# Patient Record
Sex: MALE | Race: BLACK OR AFRICAN AMERICAN | Employment: OTHER | URBAN - METROPOLITAN AREA
[De-identification: names, ages, dates, MRNs, and addresses within clinical notes are randomized per-mention and may not be internally consistent; named-entity substitution may affect disease eponyms.]

---

## 2019-06-15 ENCOUNTER — IP HISTORICAL/CONVERTED ENCOUNTER (OUTPATIENT)
Dept: OTHER | Age: 64
End: 2019-06-15

## 2020-11-10 ENCOUNTER — TRANSCRIBE ORDER (OUTPATIENT)
Dept: SCHEDULING | Age: 65
End: 2020-11-10

## 2020-11-10 DIAGNOSIS — I25.2 OLD MYOCARDIAL INFARCTION: Primary | ICD-10-CM

## 2020-11-19 ENCOUNTER — HOSPITAL ENCOUNTER (OUTPATIENT)
Dept: NON INVASIVE DIAGNOSTICS | Age: 65
Discharge: HOME | End: 2020-11-19
Attending: INTERNAL MEDICINE
Payer: MEDICARE

## 2020-11-19 VITALS
BODY MASS INDEX: 40.92 KG/M2 | DIASTOLIC BLOOD PRESSURE: 89 MMHG | WEIGHT: 270 LBS | SYSTOLIC BLOOD PRESSURE: 161 MMHG | HEIGHT: 68 IN

## 2020-11-19 DIAGNOSIS — I25.2 OLD MYOCARDIAL INFARCTION: ICD-10-CM

## 2020-11-19 LAB
ECHO AO ROOT DIAM: 4.1 CM
ECHO AV AREA PEAK VELOCITY: 3.85 CM2
ECHO AV AREA PLAN/BSA: 1.57
ECHO AV AREA VTI: 3.64 CM2
ECHO AV AREA/BSA PEAK VELOCITY: 1.7 CM2/M2
ECHO AV AREA/BSA VTI: 1.6 CM2/M2
ECHO AV CUSP MM: 2.2 CM
ECHO AV MEAN GRADIENT: 3 MMHG
ECHO AV MEAN VELOCITY: 77.2 CM/S
ECHO AV PEAK GRADIENT: 5 MMHG
ECHO AV VTI: 22.8 CM
ECHO EST RA PRESSURE: 3 MMHG
ECHO LA AREA 2C: 20.9 CM2
ECHO LA AREA 4C: 27.1 CM2
ECHO LA MAJOR AXIS: 7.17 CM
ECHO LA MINOR AXIS: 3.09 CM
ECHO LA TO AORTIC ROOT RATIO: 1.05
ECHO LV E' LATERAL VELOCITY: 6.64 CM/S
ECHO LV E' SEPTAL VELOCITY: 5.33 CM/S
ECHO LV EDV A2C: 118 CM3
ECHO LV EJECTION FRACTION A2C: 22 %
ECHO LV EJECTION FRACTION BIPLANE: 45.1 % (ref 55–100)
ECHO LV ESV A2C: 54.9 CM3
ECHO LV INTERNAL DIMENSION DIASTOLIC: 4.9 CM (ref 4.2–5.9)
ECHO LV INTERNAL DIMENSION SYSTOLIC: 3.8 CM
ECHO LV IVSD: 1 CM (ref 0.6–1)
ECHO LV MASS 2D: 213.3 G (ref 88–224)
ECHO LV MASS INDEX 2D: 91.8 G/M2 (ref 49–115)
ECHO LV POSTERIOR WALL DIASTOLIC: 1.3 CM (ref 0.6–1)
ECHO LVOT DIAM: 2.3 CM
ECHO LVOT PEAK GRADIENT: 4 MMHG
ECHO LVOT SV: 83 CM3
ECHO LVOT VTI: 20 CM
ECHO LVOT VTI: 20 CM
ECHO MV A VELOCITY: 57.7 CM/S
ECHO MV AREA PHT: 3.24 CM2
ECHO MV E DECELERATION TIME (DT): 232 MS
ECHO MV E VELOCITY: 59.2 CM/S
ECHO MV E/A RATIO: 1.03
ECHO MV E/E' LATERAL: 8.92
ECHO MV E/E' RATIO (AVERAGED): 10.01
ECHO MV E/E' SEPTAL: 11.11
ECHO MV PRESSURE HALF TIME (PHT): 68 MS
ECHO PV REGURGITANT MAX VELOCITY: 104 CM/S
ECHO PV REGURGITANT MAX VELOCITY: 112 CM/S
ECHO PV REGURGITANT MAX VELOCITY: 315 CM/S
ECHO RA AREA 4C: 21.8 CM2
ECHO RA MAJOR AXIS: 6.57 CM
ECHO RIGHT VENTRICULAR SYSTOLIC PRESSURE (RVSP): 43 MMHG
ECHO RV INTERNAL DIMENSION: 3.6 CM
ECHO RV TAPSE: 2.62 CM (ref 1.5–2)
ECHO TV MEAN GRADIENT: 40 MMHG
LA VOL DISK BP: 80.37 CM3 (ref 18–58)
LVOT MG: 2 MMHG
MV DEC SLOPE: 2550 MM/S2
MV DEC SLOPE: 2550 MM/S2

## 2020-11-19 PROCEDURE — 93306 TTE W/DOPPLER COMPLETE: CPT

## 2021-08-13 ENCOUNTER — TRANSCRIBE ORDER (OUTPATIENT)
Dept: SCHEDULING | Age: 66
End: 2021-08-13

## 2021-08-13 DIAGNOSIS — I25.5 ISCHEMIC CARDIOMYOPATHY: Primary | ICD-10-CM

## 2021-08-13 DIAGNOSIS — R00.2 PALPITATIONS: ICD-10-CM

## 2021-09-07 ENCOUNTER — TRANSCRIBE ORDER (OUTPATIENT)
Dept: REGISTRATION | Age: 66
End: 2021-09-07

## 2021-09-07 ENCOUNTER — HOSPITAL ENCOUNTER (OUTPATIENT)
Dept: NON INVASIVE DIAGNOSTICS | Age: 66
Discharge: HOME | End: 2021-09-07
Attending: INTERNAL MEDICINE
Payer: MEDICARE

## 2021-09-07 ENCOUNTER — HOSPITAL ENCOUNTER (OUTPATIENT)
Dept: GENERAL RADIOLOGY | Age: 66
Discharge: HOME | End: 2021-09-07
Attending: INTERNAL MEDICINE
Payer: MEDICARE

## 2021-09-07 VITALS
WEIGHT: 280 LBS | BODY MASS INDEX: 42.44 KG/M2 | DIASTOLIC BLOOD PRESSURE: 90 MMHG | HEIGHT: 68 IN | SYSTOLIC BLOOD PRESSURE: 180 MMHG

## 2021-09-07 DIAGNOSIS — I25.5 ISCHEMIC CARDIOMYOPATHY: ICD-10-CM

## 2021-09-07 DIAGNOSIS — R00.2 PALPITATIONS: ICD-10-CM

## 2021-09-07 DIAGNOSIS — J44.9 COPD (CHRONIC OBSTRUCTIVE PULMONARY DISEASE) (HCC): ICD-10-CM

## 2021-09-07 DIAGNOSIS — J44.9 COPD (CHRONIC OBSTRUCTIVE PULMONARY DISEASE) (HCC): Primary | ICD-10-CM

## 2021-09-07 LAB
ECHO AO ASC DIAM: 3.9 CM
ECHO AO ROOT DIAM: 4 CM
ECHO AV AREA PEAK VELOCITY: 4.3 CM2
ECHO AV AREA PLAN/BSA: 1.75
ECHO AV AREA VTI: 4.12 CM2
ECHO AV AREA/BSA PEAK VELOCITY: 1.8 CM2/M2
ECHO AV AREA/BSA VTI: 1.7 CM2/M2
ECHO AV CUSP MM: 2.1 CM
ECHO AV MEAN GRADIENT: 2 MMHG
ECHO AV MEAN VELOCITY: 60.6 CM/S
ECHO AV PEAK GRADIENT: 2 MMHG
ECHO AV PEAK VELOCITY: 77.6 CM/S
ECHO AV VTI: 15.8 CM
ECHO EST RA PRESSURE: 8 MMHG
ECHO LA AREA 2C: 31.8 CM2
ECHO LA AREA 4C: 32.4 CM2
ECHO LA MAJOR AXIS: 4.3 CM
ECHO LA MAJOR AXIS: 7.51 CM
ECHO LA TO AORTIC ROOT RATIO: 1.08
ECHO LV E' LATERAL VELOCITY: 5.87 CM/S
ECHO LV E' SEPTAL VELOCITY: 4.13 CM/S
ECHO LV EDV A2C: 157 CM3
ECHO LV EJECTION FRACTION BIPLANE: 56 % (ref 55–100)
ECHO LV ESV A2C: 54.9 CM3
ECHO LV INTERNAL DIMENSION DIASTOLIC: 5.4 CM (ref 4.2–5.9)
ECHO LV INTERNAL DIMENSION SYSTOLIC: 3.8 CM
ECHO LV IVSD: 1.1 CM (ref 0.6–1)
ECHO LV MASS 2D: 234.8 G (ref 88–224)
ECHO LV MASS INDEX 2D: 99.5 G/M2 (ref 49–115)
ECHO LV POSTERIOR WALL DIASTOLIC: 1.1 CM (ref 0.6–1)
ECHO LVOT DIAM: 2.4 CM
ECHO LVOT PEAK GRADIENT: 2 MMHG
ECHO LVOT PEAK VELOCITY: 73.9 CM/S
ECHO LVOT SV: 65 CM3
ECHO LVOT VTI: 12.8 CM
ECHO LVOT VTI: 14.4 CM
ECHO LVOT VTI: 16 CM
ECHO MV A VELOCITY: 69.1 CM/S
ECHO MV AREA PHT: 3.73 CM2
ECHO MV E DECELERATION TIME (DT): 201 MS
ECHO MV E VELOCITY: 49.5 CM/S
ECHO MV E/A RATIO: 0.72
ECHO MV E/E' LATERAL: 8.43
ECHO MV E/E' RATIO (AVERAGED): 10.21
ECHO MV E/E' SEPTAL: 11.99
ECHO MV PRESSURE HALF TIME (PHT): 59 MS
ECHO RA AREA 4C: 23 CM2
ECHO RA MAJOR AXIS: 6.58 CM
ECHO RA MINOR AXIS: 4.5 CM
ECHO RIGHT VENTRICULAR SYSTOLIC PRESSURE (RVSP): 34 MMHG
ECHO RV INTERNAL DIMENSION: 3.6 CM
ECHO RV TAPSE: 2.53 CM (ref 1.5–2)
ECHO TV MEAN GRADIENT: 26 MMHG
ECHO TV REGURGITANT MAX VELOCITY: 257 CM/S
LA VOL DISK BP: 111 CM3 (ref 18–58)
LVOT MG: 1 MMHG
MV DEC SLOPE: 2470 MM/S2
MV DEC SLOPE: 2470 MM/S2

## 2021-09-07 PROCEDURE — 71046 X-RAY EXAM CHEST 2 VIEWS: CPT

## 2021-09-07 PROCEDURE — 93306 TTE W/DOPPLER COMPLETE: CPT

## 2021-09-07 PROCEDURE — 93226 XTRNL ECG REC<48 HR SCAN A/R: CPT

## 2022-06-15 ENCOUNTER — HOSPITAL ENCOUNTER (OUTPATIENT)
Age: 67
Setting detail: OBSERVATION
Discharge: HOME | End: 2022-06-16
Attending: EMERGENCY MEDICINE | Admitting: INTERNAL MEDICINE
Payer: MEDICARE

## 2022-06-15 ENCOUNTER — APPOINTMENT (OUTPATIENT)
Dept: GENERAL RADIOLOGY | Age: 67
End: 2022-06-15
Attending: EMERGENCY MEDICINE
Payer: MEDICARE

## 2022-06-15 DIAGNOSIS — E16.2 HYPOGLYCEMIA: ICD-10-CM

## 2022-06-15 DIAGNOSIS — I48.91 ATRIAL FIBRILLATION WITH RVR (HCC): ICD-10-CM

## 2022-06-15 DIAGNOSIS — I50.9 ACUTE ON CHRONIC CONGESTIVE HEART FAILURE, UNSPECIFIED HEART FAILURE TYPE (HCC): Primary | ICD-10-CM

## 2022-06-15 PROBLEM — I50.43 ACUTE ON CHRONIC COMBINED SYSTOLIC AND DIASTOLIC CHF (CONGESTIVE HEART FAILURE) (HCC): Status: ACTIVE | Noted: 2022-06-15

## 2022-06-15 LAB
ALBUMIN SERPL-MCNC: 3 G/DL (ref 3.4–5)
ALBUMIN/GLOB SERPL: 0.8 {RATIO} (ref 0.8–1.7)
ALP SERPL-CCNC: 88 U/L (ref 45–117)
ALT SERPL-CCNC: 34 U/L (ref 16–61)
ANION GAP SERPL CALC-SCNC: 5 MMOL/L (ref 3–18)
APPEARANCE UR: ABNORMAL
AST SERPL W P-5'-P-CCNC: 22 U/L (ref 10–38)
ATRIAL RATE: 174 BPM
BACTERIA URNS QL MICRO: ABNORMAL /HPF
BASOPHILS # BLD: 0 K/UL (ref 0–0.1)
BASOPHILS NFR BLD: 0 % (ref 0–2)
BILIRUB SERPL-MCNC: 0.6 MG/DL (ref 0.2–1)
BILIRUB UR QL: NEGATIVE
BNP SERPL-MCNC: ABNORMAL PG/ML (ref 0–900)
BUN SERPL-MCNC: 22 MG/DL (ref 7–18)
BUN/CREAT SERPL: 16 (ref 12–20)
CA-I BLD-MCNC: 8.7 MG/DL (ref 8.5–10.1)
CALCULATED R AXIS, ECG10: 76 DEGREES
CALCULATED T AXIS, ECG11: 90 DEGREES
CHLORIDE SERPL-SCNC: 104 MMOL/L (ref 100–111)
CO2 SERPL-SCNC: 29 MMOL/L (ref 21–32)
COLOR UR: YELLOW
CREAT SERPL-MCNC: 1.4 MG/DL (ref 0.6–1.3)
DIAGNOSIS, 93000: NORMAL
DIFFERENTIAL METHOD BLD: ABNORMAL
EOSINOPHIL # BLD: 0 K/UL (ref 0–0.4)
EOSINOPHIL NFR BLD: 0 % (ref 0–5)
EPITH CASTS URNS QL MICRO: ABNORMAL /LPF (ref 0–20)
ERYTHROCYTE [DISTWIDTH] IN BLOOD BY AUTOMATED COUNT: 16.4 % (ref 11.6–14.5)
GLOBULIN SER CALC-MCNC: 3.6 G/DL (ref 2–4)
GLUCOSE BLD STRIP.AUTO-MCNC: 137 MG/DL (ref 70–110)
GLUCOSE BLD STRIP.AUTO-MCNC: 142 MG/DL (ref 70–110)
GLUCOSE BLD STRIP.AUTO-MCNC: 79 MG/DL (ref 70–110)
GLUCOSE BLD STRIP.AUTO-MCNC: 94 MG/DL (ref 70–110)
GLUCOSE SERPL-MCNC: 156 MG/DL (ref 74–99)
GLUCOSE UR STRIP.AUTO-MCNC: NEGATIVE MG/DL
HCT VFR BLD AUTO: 39.7 % (ref 36–48)
HGB BLD-MCNC: 11.8 G/DL (ref 13–16)
HGB UR QL STRIP: ABNORMAL
IMM GRANULOCYTES # BLD AUTO: 0 K/UL (ref 0–0.04)
IMM GRANULOCYTES NFR BLD AUTO: 0 % (ref 0–0.5)
KETONES UR QL STRIP.AUTO: NEGATIVE MG/DL
LEUKOCYTE ESTERASE UR QL STRIP.AUTO: ABNORMAL
LYMPHOCYTES # BLD: 1.3 K/UL (ref 0.9–3.6)
LYMPHOCYTES NFR BLD: 21 % (ref 21–52)
MCH RBC QN AUTO: 24.6 PG (ref 24–34)
MCHC RBC AUTO-ENTMCNC: 29.7 G/DL (ref 31–37)
MCV RBC AUTO: 82.9 FL (ref 78–100)
MONOCYTES # BLD: 0.4 K/UL (ref 0.05–1.2)
MONOCYTES NFR BLD: 7 % (ref 3–10)
NEUTS SEG # BLD: 4.2 K/UL (ref 1.8–8)
NEUTS SEG NFR BLD: 72 % (ref 40–73)
NITRITE UR QL STRIP.AUTO: NEGATIVE
NRBC # BLD: 0 K/UL (ref 0–0.01)
NRBC BLD-RTO: 0 PER 100 WBC
P-R INTERVAL, ECG05: 149 MS
PERFORMED BY, TECHID: ABNORMAL
PERFORMED BY, TECHID: ABNORMAL
PERFORMED BY, TECHID: NORMAL
PERFORMED BY, TECHID: NORMAL
PH UR STRIP: 6.5 [PH] (ref 5–8)
PLATELET # BLD AUTO: 249 K/UL (ref 135–420)
PMV BLD AUTO: 10.1 FL (ref 9.2–11.8)
POTASSIUM SERPL-SCNC: 3.7 MMOL/L (ref 3.5–5.5)
PROT SERPL-MCNC: 6.6 G/DL (ref 6.4–8.2)
PROT UR STRIP-MCNC: 100 MG/DL
Q-T INTERVAL, ECG07: 342 MS
QRS DURATION, ECG06: 97 MS
QTC CALCULATION (BEZET), ECG08: 490 MS
RBC # BLD AUTO: 4.79 M/UL (ref 4.35–5.65)
RBC #/AREA URNS HPF: ABNORMAL /HPF (ref 0–2)
SODIUM SERPL-SCNC: 138 MMOL/L (ref 136–145)
SP GR UR REFRACTOMETRY: 1.02 (ref 1–1.03)
TROPONIN-HIGH SENSITIVITY: 54 NG/L (ref 0–78)
UA: UC IF INDICATED,UAUC: ABNORMAL
UROBILINOGEN UR QL STRIP.AUTO: 1 EU/DL (ref 0.2–1)
VENTRICULAR RATE, ECG03: 123 BPM
WBC # BLD AUTO: 6 K/UL (ref 4.6–13.2)
WBC URNS QL MICRO: ABNORMAL /HPF (ref 0–4)

## 2022-06-15 PROCEDURE — G0378 HOSPITAL OBSERVATION PER HR: HCPCS

## 2022-06-15 PROCEDURE — 93005 ELECTROCARDIOGRAM TRACING: CPT

## 2022-06-15 PROCEDURE — 83880 ASSAY OF NATRIURETIC PEPTIDE: CPT

## 2022-06-15 PROCEDURE — 74011000250 HC RX REV CODE- 250: Performed by: EMERGENCY MEDICINE

## 2022-06-15 PROCEDURE — 80053 COMPREHEN METABOLIC PANEL: CPT

## 2022-06-15 PROCEDURE — 82962 GLUCOSE BLOOD TEST: CPT

## 2022-06-15 PROCEDURE — 83036 HEMOGLOBIN GLYCOSYLATED A1C: CPT

## 2022-06-15 PROCEDURE — 87186 SC STD MICRODIL/AGAR DIL: CPT

## 2022-06-15 PROCEDURE — 81001 URINALYSIS AUTO W/SCOPE: CPT

## 2022-06-15 PROCEDURE — 96376 TX/PRO/DX INJ SAME DRUG ADON: CPT

## 2022-06-15 PROCEDURE — 87077 CULTURE AEROBIC IDENTIFY: CPT

## 2022-06-15 PROCEDURE — 87086 URINE CULTURE/COLONY COUNT: CPT

## 2022-06-15 PROCEDURE — 65270000029 HC RM PRIVATE

## 2022-06-15 PROCEDURE — 84484 ASSAY OF TROPONIN QUANT: CPT

## 2022-06-15 PROCEDURE — 74011250637 HC RX REV CODE- 250/637: Performed by: NURSE PRACTITIONER

## 2022-06-15 PROCEDURE — 96375 TX/PRO/DX INJ NEW DRUG ADDON: CPT

## 2022-06-15 PROCEDURE — 71045 X-RAY EXAM CHEST 1 VIEW: CPT

## 2022-06-15 PROCEDURE — 99285 EMERGENCY DEPT VISIT HI MDM: CPT

## 2022-06-15 PROCEDURE — 74011250636 HC RX REV CODE- 250/636: Performed by: INTERNAL MEDICINE

## 2022-06-15 PROCEDURE — 74011250636 HC RX REV CODE- 250/636: Performed by: EMERGENCY MEDICINE

## 2022-06-15 PROCEDURE — 96374 THER/PROPH/DIAG INJ IV PUSH: CPT

## 2022-06-15 PROCEDURE — 85025 COMPLETE CBC W/AUTO DIFF WBC: CPT

## 2022-06-15 PROCEDURE — 74011250637 HC RX REV CODE- 250/637: Performed by: INTERNAL MEDICINE

## 2022-06-15 RX ORDER — ISOSORBIDE DINITRATE AND HYDRALAZINE HYDROCHLORIDE 37.5; 2 MG/1; MG/1
1 TABLET ORAL 2 TIMES DAILY
COMMUNITY

## 2022-06-15 RX ORDER — ISOSORBIDE DINITRATE 10 MG/1
20 TABLET ORAL 2 TIMES DAILY
Status: DISCONTINUED | OUTPATIENT
Start: 2022-06-15 | End: 2022-06-16 | Stop reason: HOSPADM

## 2022-06-15 RX ORDER — HYDRALAZINE HYDROCHLORIDE 25 MG/1
37.5 TABLET, FILM COATED ORAL 2 TIMES DAILY
Status: DISCONTINUED | OUTPATIENT
Start: 2022-06-15 | End: 2022-06-16 | Stop reason: HOSPADM

## 2022-06-15 RX ORDER — ROSUVASTATIN CALCIUM 5 MG/1
5 TABLET, COATED ORAL
COMMUNITY

## 2022-06-15 RX ORDER — METOPROLOL TARTRATE 50 MG/1
100 TABLET ORAL 2 TIMES DAILY
Status: DISCONTINUED | OUTPATIENT
Start: 2022-06-15 | End: 2022-06-15

## 2022-06-15 RX ORDER — MAGNESIUM SULFATE 100 %
4 CRYSTALS MISCELLANEOUS AS NEEDED
Status: DISCONTINUED | OUTPATIENT
Start: 2022-06-15 | End: 2022-06-16 | Stop reason: HOSPADM

## 2022-06-15 RX ORDER — IPRATROPIUM BROMIDE AND ALBUTEROL SULFATE 2.5; .5 MG/3ML; MG/3ML
SOLUTION RESPIRATORY (INHALATION)
COMMUNITY
Start: 2022-05-22

## 2022-06-15 RX ORDER — METOPROLOL TARTRATE 50 MG/1
100 TABLET ORAL 2 TIMES DAILY
Status: DISCONTINUED | OUTPATIENT
Start: 2022-06-15 | End: 2022-06-16 | Stop reason: HOSPADM

## 2022-06-15 RX ORDER — LOSARTAN POTASSIUM 25 MG/1
TABLET ORAL
COMMUNITY
Start: 2022-05-22

## 2022-06-15 RX ORDER — FUROSEMIDE 40 MG/1
40 TABLET ORAL
COMMUNITY

## 2022-06-15 RX ORDER — DILTIAZEM HYDROCHLORIDE 5 MG/ML
20 INJECTION INTRAVENOUS
Status: COMPLETED | OUTPATIENT
Start: 2022-06-15 | End: 2022-06-15

## 2022-06-15 RX ORDER — FUROSEMIDE 10 MG/ML
40 INJECTION INTRAMUSCULAR; INTRAVENOUS EVERY 12 HOURS
Status: DISCONTINUED | OUTPATIENT
Start: 2022-06-15 | End: 2022-06-16 | Stop reason: HOSPADM

## 2022-06-15 RX ORDER — BUDESONIDE AND FORMOTEROL FUMARATE DIHYDRATE 160; 4.5 UG/1; UG/1
2 AEROSOL RESPIRATORY (INHALATION) 2 TIMES DAILY
COMMUNITY

## 2022-06-15 RX ORDER — ALLOPURINOL 100 MG/1
TABLET ORAL
COMMUNITY
Start: 2022-04-30

## 2022-06-15 RX ORDER — ROSUVASTATIN CALCIUM 10 MG/1
5 TABLET, COATED ORAL
Status: DISCONTINUED | OUTPATIENT
Start: 2022-06-15 | End: 2022-06-16 | Stop reason: HOSPADM

## 2022-06-15 RX ORDER — ACETAMINOPHEN 325 MG/1
650 TABLET ORAL
Status: DISCONTINUED | OUTPATIENT
Start: 2022-06-15 | End: 2022-06-16 | Stop reason: HOSPADM

## 2022-06-15 RX ORDER — LOSARTAN POTASSIUM 25 MG/1
25 TABLET ORAL DAILY
Status: DISCONTINUED | OUTPATIENT
Start: 2022-06-16 | End: 2022-06-16 | Stop reason: HOSPADM

## 2022-06-15 RX ORDER — INSULIN LISPRO 100 [IU]/ML
INJECTION, SOLUTION INTRAVENOUS; SUBCUTANEOUS
Status: DISCONTINUED | OUTPATIENT
Start: 2022-06-15 | End: 2022-06-16 | Stop reason: HOSPADM

## 2022-06-15 RX ORDER — ISOSORBIDE DINITRATE AND HYDRALAZINE HYDROCHLORIDE 37.5; 2 MG/1; MG/1
1 TABLET ORAL 2 TIMES DAILY
Status: DISCONTINUED | OUTPATIENT
Start: 2022-06-15 | End: 2022-06-15

## 2022-06-15 RX ORDER — METFORMIN HYDROCHLORIDE 500 MG/1
TABLET ORAL
COMMUNITY
Start: 2022-04-12

## 2022-06-15 RX ORDER — RIVAROXABAN 20 MG/1
TABLET, FILM COATED ORAL
COMMUNITY
Start: 2022-05-22

## 2022-06-15 RX ORDER — ALLOPURINOL 100 MG/1
200 TABLET ORAL DAILY
Status: DISCONTINUED | OUTPATIENT
Start: 2022-06-16 | End: 2022-06-16 | Stop reason: HOSPADM

## 2022-06-15 RX ORDER — FUROSEMIDE 10 MG/ML
60 INJECTION INTRAMUSCULAR; INTRAVENOUS
Status: COMPLETED | OUTPATIENT
Start: 2022-06-15 | End: 2022-06-15

## 2022-06-15 RX ORDER — METOPROLOL TARTRATE 100 MG/1
100 TABLET ORAL 2 TIMES DAILY
COMMUNITY
Start: 2022-05-23

## 2022-06-15 RX ORDER — DEXTROSE 50 % IN WATER (D50W) INTRAVENOUS SYRINGE
25-50 AS NEEDED
Status: DISCONTINUED | OUTPATIENT
Start: 2022-06-15 | End: 2022-06-16

## 2022-06-15 RX ADMIN — ISOSORBIDE DINITRATE 20 MG: 10 TABLET ORAL at 17:05

## 2022-06-15 RX ADMIN — METOPROLOL TARTRATE 100 MG: 50 TABLET, FILM COATED ORAL at 18:19

## 2022-06-15 RX ADMIN — DILTIAZEM HYDROCHLORIDE 20 MG: 5 INJECTION INTRAVENOUS at 13:21

## 2022-06-15 RX ADMIN — ROSUVASTATIN 5 MG: 10 TABLET, FILM COATED ORAL at 21:59

## 2022-06-15 RX ADMIN — HYDRALAZINE HYDROCHLORIDE 37.5 MG: 25 TABLET, FILM COATED ORAL at 17:06

## 2022-06-15 RX ADMIN — RIVAROXABAN 20 MG: 10 TABLET, FILM COATED ORAL at 16:23

## 2022-06-15 RX ADMIN — FUROSEMIDE 40 MG: 10 INJECTION, SOLUTION INTRAMUSCULAR; INTRAVENOUS at 21:58

## 2022-06-15 RX ADMIN — FUROSEMIDE 60 MG: 10 INJECTION, SOLUTION INTRAMUSCULAR; INTRAVENOUS at 12:42

## 2022-06-15 NOTE — ED PROVIDER NOTES
EMERGENCY DEPARTMENT HISTORY AND PHYSICAL EXAM      Date: 6/15/2022  Patient Name: Peyman Friedman    History of Presenting Illness     Chief Complaint   Patient presents with    Low Blood Sugar       History Provided By: Patient and EMS    HPI: Peyman Friedman, 61 y.o. male with a past medical history significant diabetes and Coronary artery disease with stenting, ischemic cardiomyopathy, atrial fibrillation, thromboembolism with DVTs and PE, apnea presents to the ED with cc of blood sugar. MS called to patient's residence due to altered mental status. [facility] seen patient blood sugar noted to be 39. EMS unable to get IV and patient was given oral glucose with improvement of blood sugar 75. IV was eventually started and patient given D10. Patient oriented x4 at ED arrival.  He does not remember what happened, advised he feels fine but his heart rate on monitor was 140. He was also slightly tachypneic. He denied fever, chest pain, shortness of breath, abdomen pain. There are no other complaints, changes, or physical findings at this time. PCP: Jorge Feldman MD    No current facility-administered medications on file prior to encounter. Current Outpatient Medications on File Prior to Encounter   Medication Sig Dispense Refill    allopurinoL (ZYLOPRIM) 100 mg tablet TAKE 2 TABLETS BY MOUTH ONCE DAILY FOR GOUT PREVENTION      budesonide-formoteroL (SYMBICORT) 160-4.5 mcg/actuation HFAA Take 2 Puffs by inhalation two (2) times a day.  furosemide (LASIX) 40 mg tablet Take 40 mg by mouth.  albuterol-ipratropium (DUO-NEB) 2.5 mg-0.5 mg/3 ml nebu USE 1 AMPULE IN NEBULIZER 4 TIMES DAILY AND AS NEEDED FOR UP TO 6 DOSES PER DAY      isosorbide-hydrALAZINE (BIDIL) 20-37.5 mg per tablet Take 1 Tablet by mouth two (2) times a day.       losartan (COZAAR) 25 mg tablet TAKE 1 TABLET BY MOUTH ONCE DAILY FOR BLOOD PRESSURE      metFORMIN (GLUCOPHAGE) 500 mg tablet TAKE 2 TABLETS BY MOUTH TWICE DAILY FOR DIABETES      metoprolol tartrate (LOPRESSOR) 100 mg IR tablet Take 100 mg by mouth two (2) times a day.  Xarelto 20 mg tab tablet TAKE 1 TABLET BY MOUTH ONCE DAILY WITH EVENING MEAL      rosuvastatin (CRESTOR) 5 mg tablet Take 5 mg by mouth nightly. Past History     Past Medical History:  History reviewed. No pertinent past medical history. Past Surgical History:  History reviewed. No pertinent surgical history. Family History:  History reviewed. No pertinent family history. Social History:  Social History     Tobacco Use    Smoking status: Current Some Day Smoker    Smokeless tobacco: Never Used   Substance Use Topics    Alcohol use: Not Currently    Drug use: Not Currently       Allergies:  No Known Allergies      Review of Systems     Review of Systems   Constitutional: Negative for chills and fever. HENT: Negative for congestion and sore throat. Respiratory: Negative for cough and shortness of breath. Cardiovascular: Positive for palpitations. Negative for chest pain. Gastrointestinal: Negative for abdominal pain, nausea and vomiting. Genitourinary: Negative for dysuria, flank pain and frequency. Musculoskeletal: Negative for arthralgias, joint swelling and myalgias. Skin: Negative for color change and wound. Neurological: Negative for dizziness, weakness, light-headedness and headaches. Hematological: Negative for adenopathy. Physical Exam     Physical Exam  Vitals and nursing note reviewed. Constitutional:       General: He is not in acute distress. Appearance: He is well-developed. He is obese. He is not diaphoretic. Comments: Obese male in no acute distress better. Uncomfortable. HENT:      Head: Normocephalic and atraumatic. No right periorbital erythema or left periorbital erythema. Jaw: No trismus. Right Ear: External ear normal. No drainage or swelling.  Tympanic membrane is not perforated, erythematous or bulging. Left Ear: External ear normal. No drainage or swelling. Tympanic membrane is not perforated, erythematous or bulging. Nose: Nose normal. No mucosal edema or rhinorrhea. Right Sinus: No maxillary sinus tenderness or frontal sinus tenderness. Left Sinus: No maxillary sinus tenderness or frontal sinus tenderness. Mouth/Throat:      Mouth: No oral lesions. Dentition: No dental abscesses. Pharynx: Uvula midline. No oropharyngeal exudate, posterior oropharyngeal erythema or uvula swelling. Tonsils: No tonsillar abscesses. Eyes:      General: No scleral icterus. Right eye: No discharge. Left eye: No discharge. Conjunctiva/sclera: Conjunctivae normal.   Cardiovascular:      Rate and Rhythm: Tachycardia present. Rhythm irregular. Heart sounds: Normal heart sounds. No murmur heard. No friction rub. No gallop. Pulmonary:      Effort: Pulmonary effort is normal. No tachypnea, accessory muscle usage or respiratory distress. Breath sounds: Rales present. No decreased breath sounds, wheezing or rhonchi. Comments: He has rales bilateral bases. Abdominal:      General: Bowel sounds are normal. There is distension. Palpations: Abdomen is soft. Tenderness: There is no abdominal tenderness. Comments: Abdomen is obese but soft and nontender. Musculoskeletal:         General: Swelling present. No tenderness. Normal range of motion. Cervical back: Normal range of motion and neck supple. Right lower leg: Edema present. Left lower leg: Edema present. Comments: 2+ edema bilaterally. Lymphadenopathy:      Cervical: No cervical adenopathy. Skin:     General: Skin is warm and dry. Neurological:      Mental Status: He is alert and oriented to person, place, and time. Mental status is at baseline.    Psychiatric:         Judgment: Judgment normal.         Lab and Diagnostic Study Results     Labs -     Recent Results (from the past 12 hour(s))   GLUCOSE, POC    Collection Time: 06/15/22 10:56 AM   Result Value Ref Range    Glucose (POC) 137 (H) 70 - 110 mg/dL    Performed by Mario Torres    CBC WITH AUTOMATED DIFF    Collection Time: 06/15/22 11:00 AM   Result Value Ref Range    WBC 6.0 4.6 - 13.2 K/uL    RBC 4.79 4.35 - 5.65 M/uL    HGB 11.8 (L) 13.0 - 16.0 g/dL    HCT 39.7 36.0 - 48.0 %    MCV 82.9 78.0 - 100.0 FL    MCH 24.6 24.0 - 34.0 PG    MCHC 29.7 (L) 31.0 - 37.0 g/dL    RDW 16.4 (H) 11.6 - 14.5 %    PLATELET 163 333 - 048 K/uL    MPV 10.1 9.2 - 11.8 FL    NRBC 0.0 0.0  WBC    ABSOLUTE NRBC 0.00 0.00 - 0.01 K/uL    NEUTROPHILS 72 40 - 73 %    LYMPHOCYTES 21 21 - 52 %    MONOCYTES 7 3 - 10 %    EOSINOPHILS 0 0 - 5 %    BASOPHILS 0 0 - 2 %    IMMATURE GRANULOCYTES 0 0 - 0.5 %    ABS. NEUTROPHILS 4.2 1.8 - 8.0 K/UL    ABS. LYMPHOCYTES 1.3 0.9 - 3.6 K/UL    ABS. MONOCYTES 0.4 0.05 - 1.2 K/UL    ABS. EOSINOPHILS 0.0 0.0 - 0.4 K/UL    ABS. BASOPHILS 0.0 0.0 - 0.1 K/UL    ABS. IMM. GRANS. 0.0 0.00 - 0.04 K/UL    DF AUTOMATED     METABOLIC PANEL, COMPREHENSIVE    Collection Time: 06/15/22 11:00 AM   Result Value Ref Range    Sodium 138 136 - 145 mmol/L    Potassium 3.7 3.5 - 5.5 mmol/L    Chloride 104 100 - 111 mmol/L    CO2 29 21 - 32 mmol/L    Anion gap 5 3.0 - 18.0 mmol/L    Glucose 156 (H) 74 - 99 mg/dL    BUN 22 (H) 7 - 18 mg/dL    Creatinine 1.40 (H) 0.60 - 1.30 mg/dL    BUN/Creatinine ratio 16 12 - 20      GFR est AA >60 >60 ml/min/1.73m2    GFR est non-AA 51 (L) >60 ml/min/1.73m2    Calcium 8.7 8.5 - 10.1 mg/dL    Bilirubin, total 0.6 0.2 - 1.0 mg/dL    AST (SGOT) 22 10 - 38 U/L    ALT (SGPT) 34 16 - 61 U/L    Alk.  phosphatase 88 45 - 117 U/L    Protein, total 6.6 6.4 - 8.2 g/dL    Albumin 3.0 (L) 3.4 - 5.0 g/dL    Globulin 3.6 2.0 - 4.0 g/dL    A-G Ratio 0.8 0.8 - 1.7     NT-PRO BNP    Collection Time: 06/15/22 11:00 AM   Result Value Ref Range    NT pro-BNP 11,025 (H) 0 - 900 pg/mL   TROPONIN-HIGH SENSITIVITY    Collection Time: 06/15/22 11:00 AM   Result Value Ref Range    Troponin-High Sensitivity 54 0 - 78 ng/L   EKG, 12 LEAD, INITIAL    Collection Time: 06/15/22 12:11 PM   Result Value Ref Range    Ventricular Rate 123 BPM    Atrial Rate 174 BPM    P-R Interval 149 ms    QRS Duration 97 ms    Q-T Interval 342 ms    QTC Calculation (Bezet) 490 ms    Calculated R Axis 76 degrees    Calculated T Axis 90 degrees    Diagnosis       Atrial fibrillation  Ventricular premature complex  Borderline T abnormalities, lateral leads  Borderline prolonged QT interval    Confirmed by Ariana Tim ([id number]) on 6/15/2022 2:15:28 PM     GLUCOSE, POC    Collection Time: 06/15/22  1:20 PM   Result Value Ref Range    Glucose (POC) 79 70 - 110 mg/dL    Performed by Mario Torres    URINALYSIS W/ REFLEX CULTURE    Collection Time: 06/15/22  2:00 PM    Specimen: Urine   Result Value Ref Range    Color Yellow      Appearance Cloudy      Specific gravity 1.017 1.005 - 1.030      pH (UA) 6.5 5.0 - 8.0      Protein 100 (A) Negative mg/dL    Glucose Negative Negative mg/dL    Ketone Negative Negative mg/dL    Bilirubin Negative Negative      Blood NON HEMOLYTIC TRACE (A) Negative      Urobilinogen 1.0 0.2 - 1.0 EU/dL    Nitrites Negative Negative      Leukocyte Esterase Large (A) Negative      WBC  0 - 4 /hpf    RBC 5-10 0 - 2 /hpf    Epithelial cells Few 0 - 20 /lpf    Bacteria 4+ (A) None /hpf    UA:UC IF INDICATED Urine Culture Ordered (A) Culture not indicated by UA result     CULTURE, URINE    Collection Time: 06/15/22  2:00 PM    Specimen: Urine   Result Value Ref Range    Special Requests: No Special Requests      Special Requests: Reflexed from [id number]     Culture result: PENDING    GLUCOSE, POC    Collection Time: 06/15/22  4:03 PM   Result Value Ref Range    Glucose (POC) 142 (H) 70 - 110 mg/dL    Performed by Gela Ornelas, POC    Collection Time: 06/15/22  8:34 PM   Result Value Ref Range    Glucose (POC) 94 70 - 110 mg/dL    Performed by Juan C Bloom        Radiologic Studies -   @lastxrresult@  CT Results  (Last 48 hours)    None        CXR Results  (Last 48 hours)               06/15/22 1137  XR CHEST PORT Final result    Impression:      Cardiac enlargement with progressive pulmonary vascular congestion and small   left effusion. Narrative:  EXAM: XR CHEST PORT       CLINICAL INDICATION/HISTORY: AMS/CHF   -Additional: None       COMPARISON: 9/7/2021       TECHNIQUE: Portable frontal view of the chest       _______________       FINDINGS:       SUPPORT DEVICES: None. HEART AND MEDIASTINUM: Cardiac enlargement similar to prior with stable   mediastinal contours. LUNGS AND PLEURAL SPACES: Progressive increase in pulmonary vascular congestion   with small left effusion. No pneumothorax or dense consolidation. BONY THORAX AND SOFT TISSUES: Unremarkable.       _______________                   Medical Decision Making   - I am the first provider for this patient. - I reviewed the vital signs, available nursing notes, past medical history, past surgical history, family history and social history. - Initial assessment performed. The patients presenting problems have been discussed, and they are in agreement with the care plan formulated and outlined with them. I have encouraged them to ask questions as they arise throughout their visit. Vital Signs-Reviewed the patient's vital signs.   Patient Vitals for the past 12 hrs:   Temp Pulse Resp BP SpO2   06/15/22 2000 -- (!) 111 -- -- --   06/15/22 1913 97.1 °F (36.2 °C) (!) 115 22 (!) 150/103 99 %   06/15/22 1503 97.4 °F (36.3 °C) (!) 114 22 125/72 99 %   06/15/22 1339 -- 93 20 135/73 98 %   06/15/22 1309 -- (!) 115 22 (!) 159/103 100 %   06/15/22 1242 -- (!) 117 -- (!) 156/90 --   06/15/22 1139 -- (!) 125 -- -- --   06/15/22 1053 98.4 °F (36.9 °C) 99 18 (!) 160/95 99 %       Records Reviewed: Nursing Notes and Old Medical Records    The patient presents with altered mental status with a differential diagnosis of  CVA/TIA, encephalopathy, hepatic encephalopathy, hypernatremia, hyponatremia, hypoxia, insulin reaction, ICB, UTI and volume depletion      ED Course:   Patient brought to ED due to hypoglycemic episode. He has a history of ischemic cardiomyopathy myopathy with last echo with EF of 48. Also has a history of atrial fibrillation and is anticoagulated, has had DVTs and PEs in the past.  He had been treated by EMS for his hypoglycemia and thus was oriented x4 8 ED arrival.  Pulse ox 99% on room air. Patient had rales and was slightly tachypneic at arrival.  Chest x-ray showed some interstitial stational edema and BNP was 11,000. He was given 60 mg of Lasix. His atrial fibrillation was treated with 20 mg Cardizem with good improvement. Heart rate in the 80s at time of admission. Discussed with Drs. [facility] admission for further diuresis. Provider Notes (Medical Decision Making):   9:27 PM  I have spent 45 minutes of critical care time involved in lab review, consultations with specialist, family decision-making, and documentation. During this entire length of time I was immediately available to the patient. Critical Care: The reason for providing this level of medical care for this critically ill patient was due a critical illness that impaired one or more vital organ systems such that there was a high probability of imminent or life threatening deterioration in the patients condition. This care involved high complexity decision making to assess, manipulate, and support vital system functions, to treat this degreee vital organ system failure and to prevent further life threatening deterioration of the patients condition.       Procedures   Medical Decision Makingedical Decision Making      Disposition   Disposition: Admitted to telemetry   the case was discussed with the admitting physician, Tiago    Diagnosis:   1. Acute on chronic congestive heart failure, unspecified heart failure type ([address]    2. Atrial fibrillation with RVR ([address]    3. Hypoglycemia              Diagnosis     Clinical Impression:   1. Acute on chronic congestive heart failure, unspecified heart failure type ([address]    2. Atrial fibrillation with RVR ([address]    3. Hypoglycemia        Attestations:    Clarke Hooks MD    Please note that this dictation was completed with [employer], the computer voice recognition software. Quite often unanticipated grammatical, syntax, homophones, and other interpretive errors are inadvertently transcribed by the computer software. Please disregard these errors. Please excuse any errors that have escaped final proofreading. Thank you.

## 2022-06-15 NOTE — PROGRESS NOTES
[address] 89 care of patient. 1905 Patient watching TV. VS obtained. Up to restroom with assistance. 2200 scheduled medications given. No needs voiced. CBWR    0045 Patient awake. VS obtained. No needs voiced. CBWR    0445 patient sitting up on side of the bed. VS obtained. Urinal emptied. Trash taken from room. No needs voiced.  CBWR

## 2022-06-15 NOTE — ED NOTES
TRANSFER - OUT REPORT:    Verbal report given to arian (name) taylor Brooks  being transferred to   [facility] (unit) for routine progression of care       Report consisted of patients Situation, Background, Assessment and   Recommendations(SBAR). Information from the following report(s) ED Summary was reviewed with the receiving nurse. Lines:   Peripheral IV 06/15/22 Left;Posterior Wrist (Active)   Site Assessment Clean, dry, & intact 06/15/22 1103   Phlebitis Assessment 0 06/15/22 1103   Infiltration Assessment 0 06/15/22 1103   Dressing Status Clean, dry, & intact 06/15/22 1103   Alcohol Cap Used Yes 06/15/22 1103        Opportunity for questions and clarification was provided.       Patient transported with:   Monitor

## 2022-06-15 NOTE — ED TRIAGE NOTES
EMS called for pt with low blood sugar. BG was 39 on arrival and pt was combative and altered, given 2 tubes oral glucose and increased to 75. IV started and pt given 250 ml of D10. Pt [now A&O x 4 on arrival to the ER. Urine has strong smell, believes he may have a UTI. Has not really been eating the last few days.

## 2022-06-15 NOTE — H&P
Hospitalist Admission Note    NAME: Emily Asencio   :     MRN:  [id number]     Date/Time:  6/15/2022 3:38 PM    Patient PCP: Ghanshyam Tyson MD  ______________________________________________________________________  Given the patient's current clinical presentation, I have a high level of concern for decompensation if discharged from the emergency department. Complex decision making was performed, which includes reviewing the patient's available past medical records, laboratory results, and x-ray films. My assessment of this patient's clinical condition and my plan of care is as follows. Assessment / Plan:  Acute on chronic CHF- systolic, ischemic cardiomyopathy, cad w/ stents,   - admit, tele  - lasix 40mg iv bid  - strict i'o  - cont ace/bb    DM with hypoglycemia  - hold orals, ins ss only for now    Copd  - duonebs prn    HTN  - cont all home meds    afib  - cont xarelto, cont bb    HLD  - cont statin    Hx dvt  - cont xarelto    Code Status:full  Surrogate Decision Maker:    DVT Prophylaxis: xarelto      Subjective:   CHIEF COMPLAINT: low BS    HISTORY OF PRESENT ILLNESS:     Yasmeen Da Silva is a 61 y.o.  male who presents to ed with low BS and sob. EMS called for pt with low blood sugar. BG was 39 on arrival and pt was combative and altered, given 2 tubes oral glucose and increased to 75. IV started and pt given 250 ml of D10. Pt [now A&O x 4 on arrival to the ER. Urine has strong smell, believes he may have a UTI. Has not really been eating the last few days. Pt very poor historian, I have to get hx from ED notes. Pt admits that he is always sob, doesn't know his wt. Doesn't know his meds. Has been SOB since . Doesn't lay down when he sleeps. No chest pain. Admits to feet more swollen in last 2 weeks. Can't correlate leg swelling to sob. No fevers, no chills. Not requriing o2 during my visit. Doesn't remember why he came to ed.      We were asked to admit for work up and evaluation of the above problems. History reviewed. Chf, htn, dm, cad    History reviewed. No pertinent surgical history. Social History     Tobacco Use    Smoking status: Current Some Day Smoker    Smokeless tobacco: Never Used   Substance Use Topics    Alcohol use: Not Currently        History reviewed. No pertinent family history. No Known Allergies     Prior to Admission medications    Medication Sig Start Date End Date Taking? Authorizing Provider   allopurinoL (ZYLOPRIM) 100 mg tablet TAKE 2 TABLETS BY MOUTH ONCE DAILY FOR GOUT PREVENTION 4/30/22   Darren Higgins MD   budesonide-formoteroL (SYMBICORT) 160-4.5 mcg/actuation HFAA Take 2 Puffs by inhalation two (2) times a day. Darrne Higgins MD   furosemide (LASIX) 40 mg tablet Take 40 mg by mouth. Darren Higgins MD   albuterol-ipratropium (DUO-NEB) 2.5 mg-0.5 mg/3 ml nebu USE 1 AMPULE IN NEBULIZER 4 TIMES DAILY AND AS NEEDED FOR UP TO 6 DOSES PER DAY 5/22/22   Darren Higgins MD   isosorbide-hydrALAZINE (BIDIL) 20-37.5 mg per tablet Take 1 Tablet by mouth two (2) times a day. Darren Higgins MD   losartan (COZAAR) 25 mg tablet TAKE 1 TABLET BY MOUTH ONCE DAILY FOR BLOOD PRESSURE 5/22/22   Darren Higgins MD   metFORMIN (GLUCOPHAGE) 500 mg tablet TAKE 2 TABLETS BY MOUTH TWICE DAILY FOR DIABETES 4/12/22   Darren Higgins MD   metoprolol tartrate (LOPRESSOR) 100 mg IR tablet Take 100 mg by mouth two (2) times a day. 5/23/22   Darren Higgins MD   Xarelto 20 mg tab tablet TAKE 1 TABLET BY MOUTH ONCE DAILY WITH EVENING MEAL 5/22/22   Darren Higgins MD   rosuvastatin (CRESTOR) 5 mg tablet Take 5 mg by mouth nightly. Darren Higgins MD       REVIEW OF SYSTEMS:     I am not able to complete the review of systems because:    The patient is intubated and sedated    The patient has altered mental status due to his acute medical problems    The patient has baseline aphasia from prior stroke(s)    The patient has baseline dementia and is not reliable historian The patient is in acute medical distress and unable to provide information           Total of 12 systems reviewed as follows:       POSITIVE= underlined text  Negative = text not underlined  General:  fever, chills, sweats, generalized weakness, weight loss/gain,      loss of appetite   Eyes:    blurred vision, eye pain, loss of vision, double vision  ENT:    rhinorrhea, pharyngitis   Respiratory:   SOB, SMITH,  Cardiology:   orthopnea,edema,  Gastrointestinal:  abdominal pain , N/V, diarrhea, dysphagia, constipation, bleeding   Genitourinary:  frequency, urgency, dysuria, hematuria, incontinence   Muskuloskeletal :  arthralgia, myalgia, back pain  Hematology:  easy bruising, nose or gum bleeding, lymphadenopathy   Dermatological: rash, ulceration, pruritis, color change / jaundice  Endocrine:   Low bs  Neurological:  headache, dizziness, confusion, focal weakness, paresthesia,     Speech difficulties, memory loss, gait difficulty  Psychological: Feelings of anxiety, depression, agitation    Objective:   VITALS:    Visit Vitals  /72   Pulse (!) 114   Temp 97.4 °F (36.3 °C)   Resp 22   Ht 5' 8\" (1.727 m)   Wt 122 kg (269 lb)   SpO2 99%   BMI 40.90 kg/m²       PHYSICAL EXAM:    General:    Alert, cooperative, no distress, appears stated age. HEENT: Atraumatic, anicteric sclerae, pink conjunctivae     No oral ulcers, mucosa moist, throat clear, dentition fair  Neck:  Supple, symmetrical,  thyroid: non tender  Lungs:   Clear to auscultation bilaterally. No Wheezing or Rhonchi. No rales. Chest wall:  No tenderness  No Accessory muscle use. Heart:   Regular  rhythm,  No  murmur   + edema  Abdomen:   Soft, non-tender. Not distended. Bowel sounds normal  Extremities: No cyanosis. No clubbing,      Skin turgor normal, Capillary refill normal, Radial dial pulse 2+  Skin:     Not pale. Not Jaundiced  No rashes   Psych:  Good insight. Not depressed. Not anxious or agitated. Neurologic: EOMs intact.  No facial asymmetry. No aphasia or slurred speech. Symmetrical strength, Sensation grossly intact. Alert and oriented X 4.     _______________________________________________________________________  Care Plan discussed with:    Comments   Patient x    Family      RN     Care Manager                    Consultant:      _______________________________________________________________________  Expected  Disposition:   Home with Family x   HH/PT/OT/RN    SNF/LTC    ELICEO    ________________________________________________________________________  TOTAL TIME:  28 Minutes    Critical Care Provided     Minutes non procedure based      Comments    x Reviewed previous records   >50% of visit spent in counseling and coordination of care  Discussion with patient and/or family and questions answered       ________________________________________________________________________  Signed: Anand Hutchison MD    Procedures: see electronic medical records for all procedures/Xrays and details which were not copied into this note but were reviewed prior to creation of Plan. LAB DATA REVIEWED:    Recent Results (from the past 24 hour(s))   GLUCOSE, POC    Collection Time: 06/15/22 10:56 AM   Result Value Ref Range    Glucose (POC) 137 (H) 70 - 110 mg/dL    Performed by Mario HENNESSY WITH AUTOMATED DIFF    Collection Time: 06/15/22 11:00 AM   Result Value Ref Range    WBC 6.0 4.6 - 13.2 K/uL    RBC 4.79 4.35 - 5.65 M/uL    HGB 11.8 (L) 13.0 - 16.0 g/dL    HCT 39.7 36.0 - 48.0 %    MCV 82.9 78.0 - 100.0 FL    MCH 24.6 24.0 - 34.0 PG    MCHC 29.7 (L) 31.0 - 37.0 g/dL    RDW 16.4 (H) 11.6 - 14.5 %    PLATELET 092 806 - 223 K/uL    MPV 10.1 9.2 - 11.8 FL    NRBC 0.0 0.0  WBC    ABSOLUTE NRBC 0.00 0.00 - 0.01 K/uL    NEUTROPHILS 72 40 - 73 %    LYMPHOCYTES 21 21 - 52 %    MONOCYTES 7 3 - 10 %    EOSINOPHILS 0 0 - 5 %    BASOPHILS 0 0 - 2 %    IMMATURE GRANULOCYTES 0 0 - 0.5 %    ABS. NEUTROPHILS 4.2 1.8 - 8.0 K/UL    ABS.  LYMPHOCYTES 1.3 0.9 - 3.6 K/UL    ABS. MONOCYTES 0.4 0.05 - 1.2 K/UL    ABS. EOSINOPHILS 0.0 0.0 - 0.4 K/UL    ABS. BASOPHILS 0.0 0.0 - 0.1 K/UL    ABS. IMM. GRANS. 0.0 0.00 - 0.04 K/UL    DF AUTOMATED     METABOLIC PANEL, COMPREHENSIVE    Collection Time: 06/15/22 11:00 AM   Result Value Ref Range    Sodium 138 136 - 145 mmol/L    Potassium 3.7 3.5 - 5.5 mmol/L    Chloride 104 100 - 111 mmol/L    CO2 29 21 - 32 mmol/L    Anion gap 5 3.0 - 18.0 mmol/L    Glucose 156 (H) 74 - 99 mg/dL    BUN 22 (H) 7 - 18 mg/dL    Creatinine 1.40 (H) 0.60 - 1.30 mg/dL    BUN/Creatinine ratio 16 12 - 20      GFR est AA >60 >60 ml/min/1.73m2    GFR est non-AA 51 (L) >60 ml/min/1.73m2    Calcium 8.7 8.5 - 10.1 mg/dL    Bilirubin, total 0.6 0.2 - 1.0 mg/dL    AST (SGOT) 22 10 - 38 U/L    ALT (SGPT) 34 16 - 61 U/L    Alk.  phosphatase 88 45 - 117 U/L    Protein, total 6.6 6.4 - 8.2 g/dL    Albumin 3.0 (L) 3.4 - 5.0 g/dL    Globulin 3.6 2.0 - 4.0 g/dL    A-G Ratio 0.8 0.8 - 1.7     NT-PRO BNP    Collection Time: 06/15/22 11:00 AM   Result Value Ref Range    NT pro-BNP 11,025 (H) 0 - 900 pg/mL   TROPONIN-HIGH SENSITIVITY    Collection Time: 06/15/22 11:00 AM   Result Value Ref Range    Troponin-High Sensitivity 54 0 - 78 ng/L   EKG, 12 LEAD, INITIAL    Collection Time: 06/15/22 12:11 PM   Result Value Ref Range    Ventricular Rate 123 BPM    Atrial Rate 174 BPM    P-R Interval 149 ms    QRS Duration 97 ms    Q-T Interval 342 ms    QTC Calculation (Bezet) 490 ms    Calculated R Axis 76 degrees    Calculated T Axis 90 degrees    Diagnosis       Atrial fibrillation  Ventricular premature complex  Borderline T abnormalities, lateral leads  Borderline prolonged QT interval    Confirmed by Ronnie Zamora ([id number]) on 6/15/2022 2:15:28 PM     GLUCOSE, POC    Collection Time: 06/15/22  1:20 PM   Result Value Ref Range    Glucose (POC) 79 70 - 110 mg/dL    Performed by Janina Mike

## 2022-06-16 VITALS
SYSTOLIC BLOOD PRESSURE: 150 MMHG | HEART RATE: 108 BPM | WEIGHT: 269 LBS | HEIGHT: 68 IN | RESPIRATION RATE: 19 BRPM | BODY MASS INDEX: 40.77 KG/M2 | DIASTOLIC BLOOD PRESSURE: 110 MMHG | OXYGEN SATURATION: 99 % | TEMPERATURE: 96.9 F

## 2022-06-16 LAB
ANION GAP SERPL CALC-SCNC: 7 MMOL/L (ref 3–18)
BASOPHILS # BLD: 0 K/UL (ref 0–0.1)
BASOPHILS NFR BLD: 0 % (ref 0–2)
BUN SERPL-MCNC: 22 MG/DL (ref 7–18)
BUN/CREAT SERPL: 14 (ref 12–20)
CA-I BLD-MCNC: 9 MG/DL (ref 8.5–10.1)
CHLORIDE SERPL-SCNC: 104 MMOL/L (ref 100–111)
CO2 SERPL-SCNC: 31 MMOL/L (ref 21–32)
CREAT SERPL-MCNC: 1.54 MG/DL (ref 0.6–1.3)
DIFFERENTIAL METHOD BLD: ABNORMAL
EOSINOPHIL # BLD: 0.1 K/UL (ref 0–0.4)
EOSINOPHIL NFR BLD: 1 % (ref 0–5)
ERYTHROCYTE [DISTWIDTH] IN BLOOD BY AUTOMATED COUNT: 16.4 % (ref 11.6–14.5)
EST. AVERAGE GLUCOSE BLD GHB EST-MCNC: 105 MG/DL
GLUCOSE BLD STRIP.AUTO-MCNC: 104 MG/DL (ref 70–110)
GLUCOSE BLD STRIP.AUTO-MCNC: 108 MG/DL (ref 70–110)
GLUCOSE BLD STRIP.AUTO-MCNC: 67 MG/DL (ref 70–110)
GLUCOSE SERPL-MCNC: 55 MG/DL (ref 74–99)
HBA1C MFR BLD: 5.3 % (ref 4.2–5.6)
HCT VFR BLD AUTO: 38.2 % (ref 36–48)
HGB BLD-MCNC: 11.2 G/DL (ref 13–16)
IMM GRANULOCYTES # BLD AUTO: 0 K/UL (ref 0–0.04)
IMM GRANULOCYTES NFR BLD AUTO: 0 % (ref 0–0.5)
LYMPHOCYTES # BLD: 2.2 K/UL (ref 0.9–3.6)
LYMPHOCYTES NFR BLD: 31 % (ref 21–52)
MAGNESIUM SERPL-MCNC: 2.1 MG/DL (ref 1.6–2.6)
MCH RBC QN AUTO: 24.4 PG (ref 24–34)
MCHC RBC AUTO-ENTMCNC: 29.3 G/DL (ref 31–37)
MCV RBC AUTO: 83.2 FL (ref 78–100)
MONOCYTES # BLD: 0.6 K/UL (ref 0.05–1.2)
MONOCYTES NFR BLD: 9 % (ref 3–10)
NEUTS SEG # BLD: 4.1 K/UL (ref 1.8–8)
NEUTS SEG NFR BLD: 59 % (ref 40–73)
NRBC # BLD: 0 K/UL (ref 0–0.01)
NRBC BLD-RTO: 0 PER 100 WBC
PERFORMED BY, TECHID: ABNORMAL
PERFORMED BY, TECHID: NORMAL
PERFORMED BY, TECHID: NORMAL
PHOSPHATE SERPL-MCNC: 4.3 MG/DL (ref 2.5–4.9)
PLATELET # BLD AUTO: 246 K/UL (ref 135–420)
PMV BLD AUTO: 10 FL (ref 9.2–11.8)
POTASSIUM SERPL-SCNC: 3.8 MMOL/L (ref 3.5–5.5)
RBC # BLD AUTO: 4.59 M/UL (ref 4.35–5.65)
SODIUM SERPL-SCNC: 142 MMOL/L (ref 136–145)
WBC # BLD AUTO: 7 K/UL (ref 4.6–13.2)

## 2022-06-16 PROCEDURE — 74011250637 HC RX REV CODE- 250/637: Performed by: INTERNAL MEDICINE

## 2022-06-16 PROCEDURE — 96376 TX/PRO/DX INJ SAME DRUG ADON: CPT

## 2022-06-16 PROCEDURE — 74011250636 HC RX REV CODE- 250/636: Performed by: INTERNAL MEDICINE

## 2022-06-16 PROCEDURE — 82962 GLUCOSE BLOOD TEST: CPT

## 2022-06-16 PROCEDURE — G0378 HOSPITAL OBSERVATION PER HR: HCPCS

## 2022-06-16 PROCEDURE — 80048 BASIC METABOLIC PNL TOTAL CA: CPT

## 2022-06-16 PROCEDURE — 84100 ASSAY OF PHOSPHORUS: CPT

## 2022-06-16 PROCEDURE — 74011250637 HC RX REV CODE- 250/637: Performed by: NURSE PRACTITIONER

## 2022-06-16 PROCEDURE — 36415 COLL VENOUS BLD VENIPUNCTURE: CPT

## 2022-06-16 PROCEDURE — 83735 ASSAY OF MAGNESIUM: CPT

## 2022-06-16 PROCEDURE — 85025 COMPLETE CBC W/AUTO DIFF WBC: CPT

## 2022-06-16 RX ORDER — INSULIN GLARGINE 100 [IU]/ML
40 INJECTION, SOLUTION SUBCUTANEOUS
Qty: 10 PEN | Refills: 0 | Status: SHIPPED
Start: 2022-06-17

## 2022-06-16 RX ORDER — DEXTROSE MONOHYDRATE 100 MG/ML
125-250 INJECTION, SOLUTION INTRAVENOUS AS NEEDED
Status: DISCONTINUED | OUTPATIENT
Start: 2022-06-16 | End: 2022-06-16 | Stop reason: HOSPADM

## 2022-06-16 RX ADMIN — HYDRALAZINE HYDROCHLORIDE 37.5 MG: 25 TABLET, FILM COATED ORAL at 09:19

## 2022-06-16 RX ADMIN — FUROSEMIDE 40 MG: 10 INJECTION, SOLUTION INTRAMUSCULAR; INTRAVENOUS at 09:18

## 2022-06-16 RX ADMIN — METOPROLOL TARTRATE 100 MG: 50 TABLET, FILM COATED ORAL at 09:19

## 2022-06-16 RX ADMIN — ACETAMINOPHEN 650 MG: 325 TABLET ORAL at 04:45

## 2022-06-16 RX ADMIN — ALLOPURINOL 200 MG: 100 TABLET ORAL at 09:19

## 2022-06-16 RX ADMIN — LOSARTAN POTASSIUM 25 MG: 25 TABLET, FILM COATED ORAL at 09:19

## 2022-06-16 RX ADMIN — ISOSORBIDE DINITRATE 20 MG: 10 TABLET ORAL at 09:19

## 2022-06-16 NOTE — PROGRESS NOTES
Problem: Falls - Risk of  Goal: *Absence of Falls  Description: Document Zandra Rolando Fall Risk and appropriate interventions in the flowsheet.   Outcome: Resolved/Met  Note: Fall Risk Interventions:  Mobility Interventions: Bed/chair exit alarm                             Problem: Patient Education: Go to Patient Education Activity  Goal: Patient/Family Education  Outcome: Resolved/Met     Problem: Pain  Goal: *Control of Pain  Outcome: Resolved/Met  Goal: *PALLIATIVE CARE:  Alleviation of Pain  Outcome: Resolved/Met     Problem: Patient Education: Go to Patient Education Activity  Goal: Patient/Family Education  Outcome: Resolved/Met

## 2022-06-16 NOTE — PROGRESS NOTES
Care Management Interventions  PCP Verified by CM: Yes  Palliative Care Criteria Met (RRAT>21 & CHF Dx)?: No  Mode of Transport at Discharge: Other (see comment) (Family)  Transition of Care Consult (CM Consult): Discharge Planning  MyChart Signup: No  Discharge Durable Medical Equipment: No  Health Maintenance Reviewed: Yes  Physical Therapy Consult: No  Occupational Therapy Consult: No  Speech Therapy Consult: No  Support Systems: Other Family Member(s),Friend/Neighbor  Confirm Follow Up Transport: Friends  The Patient and/or Patient Representative was Provided with a Choice of Provider and Agrees with the Discharge Plan?: Yes  Freedom of Choice List was Provided with Basic Dialogue that Supports the Patient's Individualized Plan of Care/Goals, Treatment Preferences and Shares the Quality Data Associated with the Providers?: Yes  Discharge Location  Patient Expects to be Discharged to[de-identified] Home   Reason for Admission: Chart reviewed and noted patient presents with C/O low BS and SOB. EMS called for pt with low blood sugar. Blood glucose was 39 on arrival and pt was combative and altered. He was given 2 tubes oral glucose and it increased to 75. IV started and pt given 250 ml of D10. His urine has a strong smell, and believes he may have a UTI. He hasn't been eating the last few days. His feet have been more swollen in the last 2 weeks.      Dx: Acute on Chronic CHF, Hypoglycemia     PMH: DM, CAD, Ischemic Cardiology, Atrial Fibrillation, Thromboembolism with DVT's and PE                     RUR Score: 13%                    Plan for utilizing home health: TBD         PCP: First and Last name:  Sai Vega MD     Name of Practice:    Are you a current patient: Yes/No:    Approximate date of last visit:    Can you participate in a virtual visit with your PCP:                     Current Advanced Directive/Advance Care Plan: Full Code- No ACP      Healthcare Decision Maker: Myrtle Sharpe- [phone] Click here to complete [address] including selection of the Healthcare Decision Maker Relationship (ie \"Primary\")                             Transition of Care Plan: Discharge planning is aimed at transition to home. Pt lives at home with a friend- Marbella Hughes. He has a quad cane and a nebulizer machine. Discharge planning and patient education with Medication Reconciliation. Patient will be returning back home at discharge.

## 2022-06-16 NOTE — DISCHARGE SUMMARY
Discharge Summary       PATIENT ID: Marga Luo  MRN: [id number]   YOB: 1955    DATE OF ADMISSION: 6/15/2022 10:50 AM    DATE OF DISCHARGE: 06/16/22    PRIMARY CARE PROVIDER: Olga Esparza MD     ATTENDING PHYSICIAN: Tequila Alfred MD  DISCHARGING PROVIDER: Tequila Alfred MD        CONSULTATIONS: None    PROCEDURES/SURGERIES: * No surgery found *    ADMITTING [facility] COURSE:   Tori Yang is a 61 y.o.  male who presents to ed with low BS and sob. EMS called for pt with low blood sugar.  BG was 39 on arrival and pt was combative and altered, given 2 tubes oral glucose and increased to 75.  IV started and pt given 250 ml of D10.  Pt [now A&O x 4 on arrival to the ER.   Urine has strong smell, believes he may have a UTI.  Has not really been eating the last few days. Pt very poor historian, I have to get hx from ED notes. Pt admits that he is always sob, doesn't know his wt. Doesn't know his meds. Has been SOB since 2013. Doesn't lay down when he sleeps. No chest pain. Admits to feet more swollen in last 2 weeks. Can't correlate leg swelling to sob. No fevers, no chills. Not requriing o2 during my visit. Doesn't remember why he came to ed.      We were asked to admit for work up and evaluation of the above problems. DISCHARGE DIAGNOSES / PLAN:      Assessment / Plan:  Acute on chronic CHF- systolic, ischemic cardiomyopathy, cad w/ stents,   - admit, tele  - lasix 40mg iv bid  - strict i'o  - cont ace/bb     DM with hypoglycemia  - hold orals, ins ss only for now     Copd  - duonebs prn     HTN  - cont all home meds     afib  - cont xarelto, cont bb     HLD  - cont statin     Hx dvt  - cont xarelto     Code Status:full  Surrogate Decision Maker:     DVT Prophylaxis: xarelto       Pt ok for home  He was taking 65 units lantus qhs, I think this is currently too much for him based on hx. He will take 10 units tonight and start with 40 units qhs tomorrow.   He will keep an eye on his BS and cont to work with pcp  He may need higher lasix dosing, but I will not change at this time. FOLLOW UP APPOINTMENTS:    Follow-up Information     Follow up With Specialties Details Why Contact Info    Rosas Wilson MD Internal Medicine Physician In 1 week  [address]  [phone]               DIET: diabetic, cardiac      DISCHARGE MEDICATIONS:  Current Discharge Medication List      START taking these medications    Details   insulin glargine (Lantus Solostar U-100 Insulin) 100 unit/mL (3 mL) inpn 40 Units by SubCUTAneous route nightly. Told pt to take 10 units tonight, then start 40 units qhs. Watch his BS and work with his pcp. I suspect the 65 units qhs he was taking was too much. Indications: type 2 diabetes mellitus  Qty: 10 Pen, Refills: 0  Start date: 6/17/2022         CONTINUE these medications which have NOT CHANGED    Details   allopurinoL (ZYLOPRIM) 100 mg tablet TAKE 2 TABLETS BY MOUTH ONCE DAILY FOR GOUT PREVENTION      budesonide-formoteroL (SYMBICORT) 160-4.5 mcg/actuation HFAA Take 2 Puffs by inhalation two (2) times a day. furosemide (LASIX) 40 mg tablet Take 40 mg by mouth. albuterol-ipratropium (DUO-NEB) 2.5 mg-0.5 mg/3 ml nebu USE 1 AMPULE IN NEBULIZER 4 TIMES DAILY AND AS NEEDED FOR UP TO 6 DOSES PER DAY      isosorbide-hydrALAZINE (BIDIL) 20-37.5 mg per tablet Take 1 Tablet by mouth two (2) times a day. losartan (COZAAR) 25 mg tablet TAKE 1 TABLET BY MOUTH ONCE DAILY FOR BLOOD PRESSURE      metFORMIN (GLUCOPHAGE) 500 mg tablet TAKE 2 TABLETS BY MOUTH TWICE DAILY FOR DIABETES      metoprolol tartrate (LOPRESSOR) 100 mg IR tablet Take 100 mg by mouth two (2) times a day. Xarelto 20 mg tab tablet TAKE 1 TABLET BY MOUTH ONCE DAILY WITH EVENING MEAL      rosuvastatin (CRESTOR) 5 mg tablet Take 5 mg by mouth nightly.                NOTIFY YOUR PHYSICIAN FOR ANY OF THE FOLLOWING:   Fever over 101 degrees for 24 hours.   Chest pain, shortness of breath, fever, chills, nausea, vomiting, diarrhea, change in mentation, falling, weakness, bleeding. Severe pain or pain not relieved by medications. Or, any other signs or symptoms that you may have questions about. DISPOSITION:home    PHYSICAL EXAMINATION AT DISCHARGE:  General:          Alert, cooperative, no distress, appears stated age. HEENT:           Atraumatic, anicteric sclerae, pink conjunctivae                          No oral ulcers, mucosa moist, throat clear, dentition fair  Neck:               Supple, symmetrical  Lungs:             Clear to auscultation bilaterally. No Wheezing or Rhonchi. No rales. Chest wall:      No tenderness  No Accessory muscle use. Heart:              Regular  rhythm,  No  murmur   + edema  Abdomen:        Soft, non-tender. Not distended. Bowel sounds normal  Extremities:     No cyanosis. No clubbing,                            Skin turgor normal, Capillary refill normal  Skin:                Not pale. Not Jaundiced  No rashes   Psych:             Not anxious or agitated.   Neurologic:      Alert, moves all extremities, answers questions appropriately and responds to commands       CHRONIC MEDICAL DIAGNOSES:  Problem List as of 6/16/2022 Never Reviewed          Codes Class Noted - Resolved    Acute on chronic combined systolic and diastolic CHF (congestive heart failure) ([facility]) ICD-10-CM: I50.43  ICD-9-CM: 428.43, 428.0  6/15/2022 - Present              Greater than 35 minutes were spent with the patient on counseling and coordination of care    Signed:   Glenna Tijerina MD  6/16/2022  12:11 PM

## 2022-06-19 LAB
BACTERIA SPEC CULT: ABNORMAL
BACTERIA SPEC CULT: ABNORMAL
COLONY COUNT,CNT: ABNORMAL
SPECIAL REQUESTS,SREQ: ABNORMAL
SPECIAL REQUESTS,SREQ: ABNORMAL

## 2022-08-02 ENCOUNTER — TRANSCRIBE ORDER (OUTPATIENT)
Dept: SCHEDULING | Age: 67
End: 2022-08-02

## 2022-08-02 DIAGNOSIS — R94.31 ABNORMAL EKG: Primary | ICD-10-CM

## 2022-08-02 DIAGNOSIS — R00.2 PALPITATIONS: ICD-10-CM

## 2022-08-18 ENCOUNTER — HOSPITAL ENCOUNTER (OUTPATIENT)
Dept: NON INVASIVE DIAGNOSTICS | Age: 67
Discharge: HOME | End: 2022-08-18
Attending: INTERNAL MEDICINE
Payer: MEDICARE

## 2022-08-18 VITALS
SYSTOLIC BLOOD PRESSURE: 148 MMHG | DIASTOLIC BLOOD PRESSURE: 98 MMHG | HEIGHT: 68 IN | BODY MASS INDEX: 40.77 KG/M2 | WEIGHT: 269 LBS

## 2022-08-18 DIAGNOSIS — R94.31 ABNORMAL EKG: ICD-10-CM

## 2022-08-18 DIAGNOSIS — R00.2 PALPITATIONS: ICD-10-CM

## 2022-08-18 LAB
ECHO AO ASC DIAM: 3.9 CM
ECHO AO ASCENDING AORTA INDEX: 1.68 CM/M2
ECHO AO ROOT DIAM: 4 CM
ECHO AO ROOT INDEX: 1.72 CM/M2
ECHO AV AREA PEAK VELOCITY: 3.3 CM2
ECHO AV AREA VTI: 4.7 CM2
ECHO AV AREA/BSA PEAK VELOCITY: 1.4 CM2/M2
ECHO AV AREA/BSA VTI: 2 CM2/M2
ECHO AV MEAN GRADIENT: 3 MMHG
ECHO AV MEAN VELOCITY: 0.8 M/S
ECHO AV PEAK GRADIENT: 5 MMHG
ECHO AV PEAK VELOCITY: 1.1 M/S
ECHO AV VELOCITY RATIO: 0.64
ECHO AV VTI: 12.9 CM
ECHO EST RA PRESSURE: 8 MMHG
ECHO IVC PROX: 2.6 CM
ECHO LA DIAMETER INDEX: 2.46 CM/M2
ECHO LA DIAMETER: 5.7 CM
ECHO LA TO AORTIC ROOT RATIO: 1.43
ECHO LA VOL 2C: 150 ML (ref 18–58)
ECHO LA VOL 4C: 188 ML (ref 18–58)
ECHO LA VOL BP: 177 ML (ref 18–58)
ECHO LA VOL/BSA BIPLANE: 76 ML/M2 (ref 16–34)
ECHO LA VOLUME AREA LENGTH: 187 ML
ECHO LA VOLUME INDEX A2C: 65 ML/M2 (ref 16–34)
ECHO LA VOLUME INDEX A4C: 81 ML/M2 (ref 16–34)
ECHO LA VOLUME INDEX AREA LENGTH: 81 ML/M2 (ref 16–34)
ECHO LV FRACTIONAL SHORTENING: 20 % (ref 28–44)
ECHO LV INTERNAL DIMENSION DIASTOLE INDEX: 2.16 CM/M2
ECHO LV INTERNAL DIMENSION DIASTOLIC: 5 CM (ref 4.2–5.9)
ECHO LV INTERNAL DIMENSION SYSTOLIC INDEX: 1.72 CM/M2
ECHO LV INTERNAL DIMENSION SYSTOLIC: 4 CM
ECHO LV IVSD: 1.2 CM (ref 0.6–1)
ECHO LV MASS 2D: 233.7 G (ref 88–224)
ECHO LV MASS INDEX 2D: 100.8 G/M2 (ref 49–115)
ECHO LV POSTERIOR WALL DIASTOLIC: 1.2 CM (ref 0.6–1)
ECHO LV RELATIVE WALL THICKNESS RATIO: 0.48
ECHO LVOT AREA: 5.3 CM2
ECHO LVOT AV VTI INDEX: 0.88
ECHO LVOT DIAM: 2.6 CM
ECHO LVOT MEAN GRADIENT: 1 MMHG
ECHO LVOT PEAK GRADIENT: 2 MMHG
ECHO LVOT PEAK VELOCITY: 0.7 M/S
ECHO LVOT STROKE VOLUME INDEX: 26.1 ML/M2
ECHO LVOT SV: 60.5 ML
ECHO LVOT VTI: 11.4 CM
ECHO MAIN PULMONARY ARTERY DIAMETER: 3.4 CM
ECHO MV AREA VTI: 7.4 CM2
ECHO MV LVOT VTI INDEX: 0.72
ECHO MV MAX VELOCITY: 0.7 M/S
ECHO MV MEAN GRADIENT: 1 MMHG
ECHO MV MEAN VELOCITY: 0.4 M/S
ECHO MV PEAK GRADIENT: 2 MMHG
ECHO MV VTI: 8.2 CM
ECHO PV MAX VELOCITY: 0.9 M/S
ECHO PV PEAK GRADIENT: 4 MMHG
ECHO RA VOLUME BIPLANE METHOD OF DISKS: 131 ML
ECHO RA VOLUME INDEX BP: 56 ML/M2
ECHO RIGHT VENTRICULAR SYSTOLIC PRESSURE (RVSP): 41 MMHG
ECHO RV INTERNAL DIMENSION: 3.8 CM
ECHO TV REGURGITANT MAX VELOCITY: 2.88 M/S
ECHO TV REGURGITANT PEAK GRADIENT: 33 MMHG

## 2022-08-18 PROCEDURE — 93226 XTRNL ECG REC<48 HR SCAN A/R: CPT

## 2022-08-18 PROCEDURE — 93306 TTE W/DOPPLER COMPLETE: CPT

## 2022-08-31 ENCOUNTER — TRANSCRIBE ORDER (OUTPATIENT)
Dept: SCHEDULING | Age: 67
End: 2022-08-31

## 2022-08-31 DIAGNOSIS — R00.2 PALPITATIONS: Primary | ICD-10-CM

## 2022-09-12 ENCOUNTER — HOSPITAL ENCOUNTER (OUTPATIENT)
Dept: NON INVASIVE DIAGNOSTICS | Age: 67
Discharge: HOME | End: 2022-09-12
Attending: INTERNAL MEDICINE
Payer: MEDICARE

## 2022-09-12 DIAGNOSIS — R00.2 PALPITATIONS: ICD-10-CM

## 2022-09-12 PROCEDURE — 93226 XTRNL ECG REC<48 HR SCAN A/R: CPT
